# Patient Record
Sex: FEMALE | Race: WHITE | Employment: UNEMPLOYED | URBAN - METROPOLITAN AREA
[De-identification: names, ages, dates, MRNs, and addresses within clinical notes are randomized per-mention and may not be internally consistent; named-entity substitution may affect disease eponyms.]

---

## 2017-01-24 RX ORDER — HYDROCHLOROTHIAZIDE 25 MG/1
25 TABLET ORAL DAILY
Qty: 30 TAB | Refills: 2 | Status: SHIPPED | OUTPATIENT
Start: 2017-01-24 | End: 2017-03-02 | Stop reason: SDUPTHER

## 2017-02-13 ENCOUNTER — DOCUMENTATION ONLY (OUTPATIENT)
Dept: INTERNAL MEDICINE CLINIC | Age: 62
End: 2017-02-13

## 2017-02-13 ENCOUNTER — OFFICE VISIT (OUTPATIENT)
Dept: INTERNAL MEDICINE CLINIC | Age: 62
End: 2017-02-13

## 2017-02-13 VITALS
OXYGEN SATURATION: 95 % | BODY MASS INDEX: 37.49 KG/M2 | TEMPERATURE: 97.5 F | DIASTOLIC BLOOD PRESSURE: 60 MMHG | RESPIRATION RATE: 16 BRPM | SYSTOLIC BLOOD PRESSURE: 98 MMHG | HEIGHT: 64 IN | WEIGHT: 219.6 LBS | HEART RATE: 86 BPM

## 2017-02-13 DIAGNOSIS — F41.0 ANXIETY ATTACK: ICD-10-CM

## 2017-02-13 DIAGNOSIS — I10 ESSENTIAL HYPERTENSION: ICD-10-CM

## 2017-02-13 DIAGNOSIS — L98.9 SKIN LESION OF LEFT LEG: ICD-10-CM

## 2017-02-13 DIAGNOSIS — E66.9 OBESITY (BMI 30-39.9): ICD-10-CM

## 2017-02-13 DIAGNOSIS — J44.9 ASTHMA WITH COPD (HCC): Primary | ICD-10-CM

## 2017-02-13 RX ORDER — FLUTICASONE PROPIONATE AND SALMETEROL 250; 50 UG/1; UG/1
1 POWDER RESPIRATORY (INHALATION) 2 TIMES DAILY
Qty: 60 EACH | Refills: 0 | Status: SHIPPED | OUTPATIENT
Start: 2017-02-13 | End: 2017-03-15

## 2017-02-13 RX ORDER — ALBUTEROL SULFATE 90 UG/1
1 AEROSOL, METERED RESPIRATORY (INHALATION)
Qty: 1 INHALER | Refills: 0 | Status: SHIPPED | OUTPATIENT
Start: 2017-02-13 | End: 2017-08-16 | Stop reason: SDUPTHER

## 2017-02-13 RX ORDER — ALPRAZOLAM 0.5 MG/1
0.5 TABLET ORAL
Qty: 30 TAB | Refills: 0 | Status: SHIPPED | OUTPATIENT
Start: 2017-02-13 | End: 2017-11-16 | Stop reason: SDUPTHER

## 2017-02-13 NOTE — PROGRESS NOTES
Chief Complaint   Patient presents with    Medication Refill     blood pressure and an anxiety pill, xanax. states she never picked up the script.

## 2017-02-13 NOTE — MR AVS SNAPSHOT
Visit Information Date & Time Provider Department Dept. Phone Encounter #  
 2/13/2017  2:45 PM Rudolph Gardner MD [facility] Internal Medicine [phone] [id number] Upcoming Health Maintenance Date Due Hepatitis C Screening 1955 Pneumococcal 19-64 Medium Risk (1 of 1 - PPSV23) 1/12/1973 DTaP/Tdap/Td series (1 - Tdap) 1/12/1975 PAP AKA CERVICAL CYTOLOGY 1/12/1975 FOBT Q 1 YEAR AGE 50-75 1/12/2004 ZOSTER VACCINE AGE 60> 1/12/2014 INFLUENZA AGE 9 TO ADULT 8/1/2016 BREAST CANCER SCRN MAMMOGRAM 11/10/2018 Allergies as of 2/13/2017  Review Complete On: 2/13/2017 By: Ammy Hanson LPN Severity Noted Reaction Type Reactions Diphenhydramine  09/14/2016    Hives Current Immunizations  Never Reviewed No immunizations on file. Not reviewed this visit You Were Diagnosed With   
  
 Codes Comments Ast[facility] with [facility] ([facility]    -  Primary ICD-10-CM: J44.9, J45.909 ICD-9-CM: 493.20 Essential hypertension     ICD-10-CM: I10 
ICD-9-CM: 401.9 Obesity (BMI 30-39. 9)     ICD-10-CM: E66.9 ICD-9-CM: 278.00 Skin lesion of left leg     ICD-10-CM: L98.9 ICD-9-CM: 709.9 Anxiety attack     ICD-10-CM: F41.0 ICD-9-CM: 300.01 Vitals BP Pulse Temp Resp Height(growth percentile) Weight(growth percentile) 98/60 (BP 1 Location: Left arm, BP Patient Position: Sitting) 86 97.5 °F (36.4 °C) (Oral) 16 5' 4\" (1.626 m) 219 lb 9.6 oz (99.6 kg) SpO2 BMI OB Status Smoking Status 95% 37.69 kg/m2 Postmenopausal Former Smoker BMI and BSA Data Body Mass Index Body Surface Area  
 37.69 kg/m 2 2.12 m 2 Preferred Pharmacy Pharmacy Name Phone [id number] [place], [place] [phone] Your Updated Medication List  
  
   
This list is accurate as of: 2/13/17  3:49 PM.  Always use your most recent med list.  
  
  
  
  
 * albuterol 90 mcg/actuation inhaler Commonly known as:  PROVENTIL HFA, VENTOLIN HFA, PROAIR HFA Take  by inhalation. * albuterol 90 mcg/actuation inhaler Commonly known as:  PROVENTIL HFA, VENTOLIN HFA, PROAIR HFA Take 1 Puff by inhalation every six (6) hours as needed for Wheezing for up to 30 days. ALPRAZolam 0.5 mg tablet Commonly known as:  Thalia Jane Take 1 Tab by mouth nightly as needed for Anxiety for up to 30 doses. Max Daily Amount: 0.5 mg.  
  
 aspirin 81 mg chewable tablet Take 81 mg by mouth daily. fluticasone-salmeterol 250-50 mcg/dose diskus inhaler Commonly known as:  ADVAIR Take 1 Puff by inhalation two (2) times a day for 30 days. hydroCHLOROthiazide 25 mg tablet Commonly known as:  HYDRODIURIL Take 1 Tab by mouth daily. lisinopril 20 mg tablet Commonly known as:  Robertha Roup Take  by mouth daily. TYLENOL 325 mg tablet Generic drug:  acetaminophen Take  by mouth every four (4) hours as needed for Pain. * Notice: This list has 2 medication(s) that are the same as other medications prescribed for you. Read the directions carefully, and ask your doctor or other care provider to review them with you. Prescriptions Printed Refills ALPRAZolam (XANAX) 0.5 mg tablet 0 Sig: Take 1 Tab by mouth nightly as needed for Anxiety for up to 30 doses. Max Daily Amount: 0.5 mg.  
 Class: Print Route: Oral  
  
Prescriptions Sent to Pharmacy Refills  
 albuterol (PROVENTIL HFA, VENTOLIN HFA, PROAIR HFA) 90 mcg/actuation inhaler 0 Sig: Take 1 Puff by inhalation every six (6) hours as needed for Wheezing for up to 30 days. Class: Normal  
 Pharmacy: [address] [place] [address] #: [phone] Route: Inhalation  
 fluticasone-salmeterol (ADVAIR) 250-50 mcg/dose diskus inhaler 0 Sig: Take 1 Puff by inhalation two (2) times a day for 30 days. Class: Normal  
 Pharmacy: [id number] [facility] [place]  #: [phone] Route: Inhalation We Performed the Following REFERRAL TO DERMATOLOGY [REF19 Custom] Referral Information Referral ID Referred By Referred To  
  
 [id number] Nithin ANAND NP   
   [address], [address] [place] [address] Phone: [phone] Fax: [phone] Visits Status Start Date End Date 1 New Request 2/13/17 2/13/18 If your referral has a status of pending review or denied, additional information will be sent to support the outcome of this decision. Introducing [facility]! [address] introduces [employer] patient portal. Now you can access parts of your medical record, email your doctor's office, and request medication refills online. 1. In your internet browser, go to [email or web address] 2. Click on the First Time User? Click Here link in the Sign In box. You will see the New Member Sign Up page. 3. Enter your Declara Access Code exactly as it appears below. You will not need to use this code after youve completed the sign-up process. If you do not sign up before the expiration date, you must request a new code. · Declara Access Code: [email or web address] Expires: 5/14/2017  3:49 PM 
 
4. Enter the last four digits of your Social Security Number (xxxx) and Date of Birth (mm/dd/yyyy) as indicated and click Submit. You will be taken to the next sign-up page. 5. Create a GetAFivet ID. This will be your GetAFivet login ID and cannot be changed, so think of one that is secure and easy to remember. 6. Create a GetAFivet password. You can change your password at any time. 7. Enter your Password Reset Question and Answer. This can be used at a later time if you forget your password. 8. Enter your e-mail address.  You will receive e-mail notification when new information is available in [employer]. 9. Click Sign Up. You can now view and download portions of your medical record. 10. Click the Download Summary menu link to download a portable copy of your medical information. If you have questions, please visit the Frequently Asked Questions section of the [employer] website. Remember, [employer] is NOT to be used for urgent needs. For medical emergencies, dial 911. Now available from your iPhone and Android! Please provide this summary of care documentation to your next provider. Your primary care clinician is listed as Alhaij Nugent. If you have any questions after today's visit, please call [phone].

## 2017-02-13 NOTE — PROGRESS NOTES
Written by Riley Santoyo, as dictated by Dr. Lashon Leung MD.    Kevin Tee is a 60 y.o. female. HPI  The patient comes in today for a medication refill. She has not made an appointment with gastroenterologist. She needs to follow with a dermatologist. She followed with Dr. Nelia Carrillo (breast surgeon). She has been using the albuterol inhaler, for her asthma and COPD. She did not get her XR & PFT from her pulmonologist in [place]. Her breathing is not good right now. She has been to pulmonary rehab in the past, and it did help. She does sleep well. She has never been tested for sleep apnea & does not want to go for one. Her blood pressure readings have been running well in the normal range. She is trying to loose weight. She needs another prescription for xanax as she never picked up the last one from our office. Patient Active Problem List   Diagnosis Code    Pulmonary emphysema ([facility] J43.9    History of right breast cancer Z85.3    Essential hypertension with goal blood pressure less than 130/85 I10    Pre-diabetes R73.03    Mixed hyperlipidemia E78.2        Current Outpatient Prescriptions on File Prior to Visit   Medication Sig Dispense Refill    hydroCHLOROthiazide (HYDRODIURIL) 25 mg tablet Take 1 Tab by mouth daily. 30 Tab 2    lisinopril (PRINIVIL, ZESTRIL) 20 mg tablet Take  by mouth daily.  albuterol (PROVENTIL HFA, VENTOLIN HFA, PROAIR HFA) 90 mcg/actuation inhaler Take  by inhalation.  ALPRAZolam (XANAX) 0.25 mg tablet Take 1 Tab by mouth two (2) times daily as needed for Anxiety. Max Daily Amount: 0.5 mg. 60 Tab 0    acetaminophen (TYLENOL) 325 mg tablet Take  by mouth every four (4) hours as needed for Pain.  aspirin 81 mg chewable tablet Take 81 mg by mouth daily. No current facility-administered medications on file prior to visit.         Allergies   Allergen Reactions    Diphenhydramine Hives       Past Medical History Diagnosis Date    Asthma     COPD (chronic obstructive pulmonary disease) ([facility]     Hypertension        Past Surgical History   Procedure Laterality Date    Pr breast surgery procedure unlisted       ca    Hx breast lumpectomy Right      4/2011       Family History   Problem Relation Age of Onset    Hypertension Mother     Hypertension Father     Emphysema Father     Hypertension Sister        Social History     Social History    Marital status: SINGLE     Spouse name: N/A    Number of children: N/A    Years of education: N/A     Occupational History    Not on file. Social History Main Topics    Smoking status: Former Smoker    Smokeless tobacco: Not on file    Alcohol use No    Drug use: No    Sexual activity: Not on file     Other Topics Concern    Not on file     Social History Narrative       Review of Systems   Constitutional: Negative for malaise/fatigue. HENT: Negative for congestion. Respiratory: Positive for cough and shortness of breath. Negative for sputum production and wheezing. Cardiovascular: Negative for chest pain and palpitations. Musculoskeletal: Negative for joint pain and myalgias. Neurological: Negative for weakness and headaches. Visit Vitals    BP 98/60 (BP 1 Location: Left arm, BP Patient Position: Sitting)    Pulse 86    Temp 97.5 °F (36.4 °C) (Oral)    Resp 16    Ht 5' 4\" (1.626 m)    Wt 219 lb 9.6 oz (99.6 kg)    SpO2 95%    BMI 37.69 kg/m2     Physical Exam   Constitutional: She is oriented to person, place, and time. No distress. Obese   HENT:   Right Ear: External ear normal.   Left Ear: External ear normal.   Mouth/Throat: Oropharynx is clear and moist.   Eyes: Conjunctivae and EOM are normal. Right eye exhibits no discharge. Left eye exhibits no discharge. Neck: Normal range of motion. Neck supple. Cardiovascular: Normal rate and regular rhythm. Pulmonary/Chest: Effort normal. She has decreased breath sounds. She has wheezes. Abdominal: Soft. Bowel sounds are normal. She exhibits no distension. Lymphadenopathy:     She has no cervical adenopathy. Neurological: She is alert and oriented to person, place, and time. Skin: Skin is intact. Pearly white, raised skin lesion on R lower leg. Psychiatric: She has a normal mood and affect. Nursing note and vitals reviewed. ASSESSMENT and PLAN    ICD-10-CM ICD-9-CM    1. [facility] ([facility] J44.9 493.20 albuterol (PROVENTIL HFA, VENTOLIN HFA, PROAIR HFA) 90 mcg/actuation inhaler sent to pharmacy. J45.909  fluticasone-salmeterol (ADVAIR) 250-50 mcg/dose diskus inhaler sent to pharmacy. I discussed that she needs to be on a daily inhaler to help with opening up the lungs and allow more airflow. She needs to bring in the baseline pulmonary function test from her previous doctor. 2. Essential hypertension I10 401.9 Her BP is well under control. No change to dosage at this time. 3. Obesity (BMI 30-39. 9) E66.9 278.00 I discussed that she needs to cut her calorie intake. 4. Skin lesion of left leg L98.9 709.9 REFERRAL TO DERMATOLOGY    Order placed. I discussed that she can make an appointment before she leaves here. 5. Anxiety attack F41.0 300.01 ALPRAZolam (XANAX) 0.5 mg tablet script given to patient. I discussed that she needs to get her colonoscopy before she comes back. This plan was reviewed with the patient and patient agrees. All questions were answered. This scribe documentation was reviewed by me and accurately reflects the examination and decisions made by me. This note will not be viewable in [address]

## 2017-03-02 RX ORDER — HYDROCHLOROTHIAZIDE 25 MG/1
25 TABLET ORAL DAILY
Qty: 30 TAB | Refills: 2 | Status: SHIPPED | OUTPATIENT
Start: 2017-03-02 | End: 2017-06-21 | Stop reason: SDUPTHER

## 2017-04-20 ENCOUNTER — TELEPHONE (OUTPATIENT)
Dept: DERMATOLOGY | Facility: AMBULATORY SURGERY CENTER | Age: 62
End: 2017-04-20

## 2017-04-20 NOTE — TELEPHONE ENCOUNTER
Patient would like a call back regarding last office visit and about next appt coming up. Asked if you could please call [phone].     Thanks,

## 2017-04-20 NOTE — TELEPHONE ENCOUNTER
Called and spoke with patient, after confirming two patient identifiers, patient inquiring about the discomfort level of having \"spot on left inner ankle frozen\". I advised the patient that lesion that the provider deems appropriate to treat with cryotherapy do not require numbing or pain medication generally but each patient has a unique pain tolerance level. Patient stated she has an appointment for 5/4/2017 to be seen because she would like the spot gone.

## 2017-04-21 RX ORDER — LISINOPRIL 20 MG/1
20 TABLET ORAL DAILY
Qty: 90 TAB | Refills: 0 | Status: SHIPPED | OUTPATIENT
Start: 2017-04-21 | End: 2017-08-16 | Stop reason: SDUPTHER

## 2017-05-04 ENCOUNTER — HOSPITAL ENCOUNTER (OUTPATIENT)
Dept: LAB | Age: 62
Discharge: HOME | End: 2017-05-04

## 2017-05-04 ENCOUNTER — OFFICE VISIT (OUTPATIENT)
Dept: DERMATOLOGY | Facility: AMBULATORY SURGERY CENTER | Age: 62
End: 2017-05-04

## 2017-05-04 VITALS
HEART RATE: 89 BPM | WEIGHT: 219 LBS | OXYGEN SATURATION: 94 % | TEMPERATURE: 97.8 F | RESPIRATION RATE: 21 BRPM | SYSTOLIC BLOOD PRESSURE: 104 MMHG | DIASTOLIC BLOOD PRESSURE: 81 MMHG | BODY MASS INDEX: 37.39 KG/M2 | HEIGHT: 64 IN

## 2017-05-04 DIAGNOSIS — D48.5 NEOPLASM OF UNCERTAIN BEHAVIOR OF SKIN OF ANKLE: Primary | ICD-10-CM

## 2017-05-04 NOTE — PROGRESS NOTES
Name: Ying Apodaca       Age: 60 y.o. Date: 5/4/2017    Chief Complaint:   Chief Complaint   Patient presents with    Skin Exam       Subjective:    HPI:  Ms.. Ying Apodaca is a 60 y.o. female who presents for the evaluation of a lesion on the left ankle. Patient was seen on 03/06/2017 for lesion on her left inner ankle present approximately 4 months and described as an itchy wart-like lesion with crust. At that time, I made over the counter recommendations, and we decided to continue observing the lesion. She states that since that time, the lesion has continued to bother her and is tender to the touch, but she does not believe this has changed in appearance. She presents today for therapy of the lesion. ROS: Consitutional: Negative  Dermatological : positive for - skin lesion changes      Social History     Social History    Marital status: SINGLE     Spouse name: N/A    Number of children: N/A    Years of education: N/A     Occupational History    Not on file. Social History Main Topics    Smoking status: Former Smoker    Smokeless tobacco: Not on file    Alcohol use No    Drug use: No    Sexual activity: Not on file     Other Topics Concern    Not on file     Social History Narrative       Family History   Problem Relation Age of Onset    Hypertension Mother     Hypertension Father     Emphysema Father     Hypertension Sister        Past Medical History:   Diagnosis Date    Asthma     COPD [facility]chronic obstructive pulmonary disease) ([facility]     Hypertension     Sun-damaged skin     Sunburn, blistering        Past Surgical History:   Procedure Laterality Date    BREAST SURGERY PROCEDURE UNLISTED      ca    HX BREAST LUMPECTOMY Right     4/2011       Current Outpatient Prescriptions   Medication Sig Dispense Refill    lisinopril (PRINIVIL, ZESTRIL) 20 mg tablet Take 1 Tab by mouth daily for 90 days.  90 Tab 0    ALPRAZolam (XANAX) 0.5 mg tablet Take 1 Tab by mouth nightly as needed for Anxiety for up to 30 doses. Max Daily Amount: 0.5 mg. 30 Tab 0    acetaminophen (TYLENOL) 325 mg tablet Take  by mouth every four (4) hours as needed for Pain.  albuterol (PROVENTIL HFA, VENTOLIN HFA, PROAIR HFA) 90 mcg/actuation inhaler Take  by inhalation.  aspirin 81 mg chewable tablet Take 81 mg by mouth daily.  hydroCHLOROthiazide (HYDRODIURIL) 25 mg tablet Take 1 Tab by mouth daily. 30 Tab 2       Allergies   Allergen Reactions    Diphenhydramine Hives         Objective:    Visit Vitals    /81 (BP 1 Location: Left arm, BP Patient Position: Sitting)    Pulse 89    Temp 97.8 °F (36.6 °C) (Oral)    Resp 21    Ht 5' 4\" (1.626 m)    Wt 219 lb (99.3 kg)    LMP  (Exact Date)    SpO2 94%    BMI 37.59 kg/m2       Norma Fregoso is a 60 y.o. female who appears well and in no distress. She is awake, alert, and oriented. There is no preauricular, submandibular, or cervical lymphadenopathy. A limited skin examination was completed including her left ankle. There is a 9 x 8 mm keratotic pink papule on her right medial ankle, this is clinically different from her last exam now concerning for squamous cell carcinoma. Assessment/Plan:    1. Neoplasm of Uncertain Behavior, right medial ankle - inflamed SK vs. SCC. The differential diagnoses were discussed. We discussed options for therapy including cryotherapy vs. shave removal. I reviewed benefits and risks as well as expected healing of both procedures: healing of wound from freezing is equivalent to healing from shave removal. The lesion is clinically different from her prior examination. I strongly recommended shave removal with tissue sent off to pathology for definitive diagnosis. She understands her options and elects to proceed with a shave removal.   The procedure was reviewed and verbal and written consent were obtained. The risks of pain, bleeding, infection, recurrence and scar were discussed. I performed the procedure. The site was cleansed and anesthetized with 1% Lidocaine with Epinephrine 1:100,000. A shave removal was performed to remove the lesion in its clinical entirety. Drysol was used for hemostasis. The wound was bandaged and care reviewed. The specimen was sent to pathology. I will contact the patient with the results and any further treatment that may be necessary. [facility]   OFFICE PROCEDURE PROGRESS NOTE   Chart reviewed for the following:   ICelestine, [address], have reviewed the History, Physical and updated the Allergic reactions for [place]. TIME OUT performed immediately prior to start of procedure:   IMiley, have performed the following reviews on Shellie Victor   prior to the start of the procedure:     * Patient was identified by name and date of birth   * Agreement on procedure being performed was verified   * Risks and Benefits explained to the patient   * Procedure site verified and marked as necessary   * Patient was positioned for comfort   * Consent was signed and verified     Time: 11:30 AM  Date of procedure: 5/4/2017  Procedure performed by: Evangelist Ellington.  Kristie Stauffer  Provider assisted by: Pancho Xiong LPN   Patient assisted by: self   How tolerated by patient: tolerated the procedure well with no complications   Comments: none

## 2017-05-04 NOTE — MR AVS SNAPSHOT
Visit Information Date & Time Provider Department Dept. Phone Encounter #  
 5/4/2017 11:00 AM [facility] [id number] [phone] [id number] Upcoming Health Maintenance Date Due Hepatitis C Screening 1955 Pneumococcal 19-64 Medium Risk (1 of 1 - PPSV23) 1/12/1973 DTaP/Tdap/Td series (1 - Tdap) 1/12/1975 PAP AKA CERVICAL CYTOLOGY 1/12/1975 FOBT Q 1 YEAR AGE 50-75 1/12/2004 ZOSTER VACCINE AGE 60> 1/12/2014 INFLUENZA AGE 9 TO ADULT 8/1/2017 BREAST CANCER SCRN MAMMOGRAM 11/10/2018 Allergies as of 5/4/2017  Review Complete On: 5/4/2017 By: Angelica Adamson LPN Severity Noted Reaction Type Reactions Diphenhydramine  09/14/2016    Hives Current Immunizations  Never Reviewed No immunizations on file. Not reviewed this visit Vitals BP Pulse Temp Resp Height(growth percentile) Weight(growth percentile) 104/81 (BP 1 Location: Left arm, BP Patient Position: Sitting) 89 97.8 °F (36.6 °C) (Oral) 21 5' 4\" (1.626 m) 219 lb (99.3 kg) LMP SpO2 BMI OB Status Smoking Status (Exact Date) 94% 37.59 kg/m2 Postmenopausal Former Smoker Vitals History BMI and BSA Data Body Mass Index Body Surface Area  
 37.59 kg/m 2 2.12 m 2 Preferred Pharmacy Pharmacy Name Phone [id number] [place], [place] [phone] Your Updated Medication List  
  
   
This list is accurate as of: 5/4/17 11:10 AM.  Always use your most recent med list.  
  
  
  
  
 albuterol 90 mcg/actuation inhaler Commonly known as:  PROVENTIL HFA, VENTOLIN HFA, PROAIR HFA Take  by inhalation. ALPRAZolam 0.5 mg tablet Commonly known as:  Marilyn Wood Take 1 Tab by mouth nightly as needed for Anxiety for up to 30 doses. Max Daily Amount: 0.5 mg.  
  
 aspirin 81 mg chewable tablet Take 81 mg by mouth daily. hydroCHLOROthiazide 25 mg tablet Commonly known as:  HYDRODIURIL Take 1 Tab by mouth daily. lisinopril 20 mg tablet Commonly known as:  Idania  Take 1 Tab by mouth daily for 90 days. TYLENOL 325 mg tablet Generic drug:  acetaminophen Take  by mouth every four (4) hours as needed for Pain. Patient Instructions Self Skin Exam and Sunscreens Early detection and treatment is essential in the treatment of all forms of skin cancer. If caught early, all forms of skin cancer are curable. In addition to your regular visits, you should perform a monthly skin examination. Over time, you become familiar with what is normally found on your skin and can identify new or suspicious spots. One of the screening tools you can use to assess your skin is to follow the ABCDEs: 
 
A= Asymmetry (One half is unlike the other half) B= Border (An irregular, scalloped or poorly defined edge) C= Color (Is varied from one area to another, has shades of tan, brown/ black,       white, red or blue) D= Diameter (Spots larger than 6mm or a pencil eraser) E= Evolving (New spots or one that is changing in size, shape, or color) A follow- up interval will be customized based on your history of skin cancer or level of skin damage and risk factors. In any case, if you notice a suspicious or new spot, an appointment should be arranged between regular visits. Everyone should use sunscreen and sun-safe practices, which is especially important for those with a personal or family history of skin cancer. Suggestions for this include: 1. Use daily moisturizers containing SPF 30 or higher. 2. Wear long sleeve clothing with UPF ratings and a broad-brimmed hat. 3. Apply sunscreen with SPF 30 or higher to all sun exposed areas if you are going to be in the sun. A broad spectrum UVA/ UVB sunscreen is best.  Dont forget to REAPPLY every two hours or more often if swimming or sweating! 4. Avoid outside activities during peak sun hours, especially in the summer (10am- 2pm). 5. DO NOT use tanning beds. Using sunscreen and sun-safe practices can help reduce the likelihood of developing skin cancer or additional skin cancers in those previously diagnosed. Introducing [facility]! Chente Iniguez introduces Glimpse patient portal. Now you can access parts of your medical record, email your doctor's office, and request medication refills online. 1. In your internet browser, go to [email or web address] 2. Click on the First Time User? Click Here link in the Sign In box. You will see the New Member Sign Up page. 3. Enter your Glimpse Access Code exactly as it appears below. You will not need to use this code after youve completed the sign-up process. If you do not sign up before the expiration date, you must request a new code. · Glimpse Access Code: [email or web address] Expires: 5/14/2017  4:49 PM 
 
4. Enter the last four digits of your Social Security Number (xxxx) and Date of Birth (mm/dd/yyyy) as indicated and click Submit. You will be taken to the next sign-up page. 5. Create a Glimpse ID. This will be your Glimpse login ID and cannot be changed, so think of one that is secure and easy to remember. 6. Create a Glimpse password. You can change your password at any time. 7. Enter your Password Reset Question and Answer. This can be used at a later time if you forget your password. 8. Enter your e-mail address. You will receive e-mail notification when new information is available in [address] 9. Click Sign Up. You can now view and download portions of your medical record. 10. Click the Download Summary menu link to download a portable copy of your medical information. If you have questions, please visit the Frequently Asked Questions section of the Glimpse website. Remember, Glimpse is NOT to be used for urgent needs. For medical emergencies, dial 911. Now available from your iPhone and Android! Please provide this summary of care documentation to your next provider. Your primary care clinician is listed as oCrrine Mullen. If you have any questions after today's visit, please call [phone].

## 2017-06-21 RX ORDER — HYDROCHLOROTHIAZIDE 25 MG/1
TABLET ORAL
Qty: 30 TAB | Refills: 0 | Status: SHIPPED | OUTPATIENT
Start: 2017-06-21 | End: 2017-08-07 | Stop reason: SDUPTHER

## 2017-08-07 RX ORDER — HYDROCHLOROTHIAZIDE 25 MG/1
TABLET ORAL
Qty: 30 TAB | Refills: 0 | Status: SHIPPED | OUTPATIENT
Start: 2017-08-07 | End: 2017-09-12 | Stop reason: SDUPTHER

## 2017-08-15 ENCOUNTER — TELEPHONE (OUTPATIENT)
Dept: INTERNAL MEDICINE CLINIC | Age: 62
End: 2017-08-15

## 2017-08-15 ENCOUNTER — TELEPHONE (OUTPATIENT)
Dept: SURGERY | Age: 62
End: 2017-08-15

## 2017-08-15 DIAGNOSIS — R92.8 ABNORMAL MAMMOGRAM: ICD-10-CM

## 2017-08-15 DIAGNOSIS — Z85.3 HISTORY OF RIGHT BREAST CANCER: Primary | ICD-10-CM

## 2017-08-15 NOTE — TELEPHONE ENCOUNTER
Received voicemail from [facility] scheduling (#[phone]) regarding patient needing mammogram and EKG orders. They were told they would be faxed, but they have not received them yet. I reviewed patient's chart and PCP office has been communicating with patient about this, not our office. I called [facility] scheduling back and spoke with representative. I gave them Dr. Calvo Homes name and office number. I gave her our phone number so we can help them if there is a problem.

## 2017-08-16 DIAGNOSIS — J44.9 ASTHMA WITH COPD (HCC): ICD-10-CM

## 2017-08-17 RX ORDER — LISINOPRIL 20 MG/1
TABLET ORAL
Qty: 90 TAB | Refills: 0 | Status: SHIPPED | OUTPATIENT
Start: 2017-08-17 | End: 2017-09-12 | Stop reason: SDUPTHER

## 2017-08-17 RX ORDER — ALBUTEROL SULFATE 90 UG/1
AEROSOL, METERED RESPIRATORY (INHALATION)
Qty: 8.5 INHALER | Refills: 0 | Status: SHIPPED | OUTPATIENT
Start: 2017-08-17

## 2017-09-12 RX ORDER — HYDROCHLOROTHIAZIDE 25 MG/1
TABLET ORAL
Qty: 30 TAB | Refills: 0 | Status: SHIPPED | OUTPATIENT
Start: 2017-09-12 | End: 2017-09-29 | Stop reason: SDUPTHER

## 2017-09-19 ENCOUNTER — OFFICE VISIT (OUTPATIENT)
Dept: INTERNAL MEDICINE CLINIC | Age: 62
End: 2017-09-19

## 2017-09-19 VITALS
BODY MASS INDEX: 40.26 KG/M2 | OXYGEN SATURATION: 92 % | SYSTOLIC BLOOD PRESSURE: 106 MMHG | DIASTOLIC BLOOD PRESSURE: 70 MMHG | WEIGHT: 235.8 LBS | HEIGHT: 64 IN | HEART RATE: 88 BPM | TEMPERATURE: 98.2 F | RESPIRATION RATE: 14 BRPM

## 2017-09-19 DIAGNOSIS — J43.9 PULMONARY EMPHYSEMA, UNSPECIFIED EMPHYSEMA TYPE (HCC): ICD-10-CM

## 2017-09-19 DIAGNOSIS — Z71.89 ACP (ADVANCE CARE PLANNING): ICD-10-CM

## 2017-09-19 DIAGNOSIS — Z00.00 MEDICARE ANNUAL WELLNESS VISIT, SUBSEQUENT: Primary | ICD-10-CM

## 2017-09-19 DIAGNOSIS — E78.2 MIXED HYPERLIPIDEMIA: ICD-10-CM

## 2017-09-19 DIAGNOSIS — R06.09 EXERTIONAL DYSPNEA: ICD-10-CM

## 2017-09-19 DIAGNOSIS — I10 ESSENTIAL HYPERTENSION: ICD-10-CM

## 2017-09-19 RX ORDER — BUDESONIDE 0.5 MG/2ML
INHALANT ORAL
Refills: 3 | COMMUNITY
Start: 2017-08-07

## 2017-09-19 RX ORDER — ALBUTEROL SULFATE 0.83 MG/ML
SOLUTION RESPIRATORY (INHALATION)
Refills: 3 | COMMUNITY
Start: 2017-07-05

## 2017-09-19 NOTE — PATIENT INSTRUCTIONS
Medicare Part B Preventive Services Guidelines/Limitations Date last completed and Frequency Due Date   Bone Mass Measurement  (age 72 & older, biennial) Requires diagnosis related to osteoporosis or estrogen deficiency. Biennial benefit unless patient has history of long-term glucocorticoid tx or baseline is needed because initial test was by other method, or for patients with vertebral abnormalities on x-ray Completed     Recommended every 2 years As recommended by your PCP or Specialist     Cardiovascular Screening Blood Tests (every 5 years)  Total cholesterol, HDL, Triglycerides Order as a panel if possible Completed     As recommended by your PCP As recommended by your PCP or Specialist   Hepatitis B vaccines  (covered for patients at high risk- hemophilia, ESRD, DM, body fluid contact Three shot series covered once         Zoster Shingles vaccine Covered by Medicare Part D through the pharmacy- PCP provides prescription Completed     Recommended once over age 48  Due   Pneumococcal vaccine (once after age 72)  Completed   Recommended once over the age of 72 Due   Colorectal Cancer Screening  -Fecal occult blood test (annual)  -Flexible sigmoidoscopy (5y)  -Screening colonoscopy (10y)  -Barium Enema Age 49-80;  After age [de-identified] if history of abnormal results Completed      Recommended every 5 to 10 years  As recommended by your PCP or Specialist     Counseling to Prevent Tobacco Use (up to 8 sessions per year)  - Counseling greater than 3 and up to 10 minutes  - Counseling greater than 10 minutes Patients must be asymptomatic of tobacco-related conditions to receive as preventive service N/A N/A   Diabetes Screening Tests (at least every 3 years, Medicare covers annually or at 6-month intervals for prediabetic patients)    Fasting blood sugar (FBS) or glucose tolerance test (GTT) Patient must be diagnosed with one of the following:  -Hypertension, Dyslipidemia, obesity, previous impaired FBS or GTT  Or any two of the following: overweight, FH of diabetes, age ? 72, history of gestational diabetes, birth of baby weighing more than 9 pounds Completed     Recommended every 3 years for non-diabetics    Recommended every 3-6 months for Pre-Diabetics and Diabetics As recommended by your PCP or Specialist     Lung Cancer Screening-with low dose CT for patients who meet all criteria:  -Age 50-69  -either a current smoker or have quit in the past 15 yrs  -have a smoking history of 30 pack years or more  -have a written order from MD for screening Covered once every 12 months      Glaucoma Screening (no USPSTF recommendation) Covered for high risk patients such as patients with Diabetes mellitus, family history of glaucoma, , age 48 or over,  American, age 72 or over Completed     Recommended annually Due    Seasonal Influenza Vaccination (annually)  Completed   Recommended Annually Due    TDAP Vaccination Covered by Medicare part D through the pharmacy- PCP provides prescription Completed     Recommended every 10 years Due    Prevnar 15 vaccine  Prevnar 15 - Recommended once over the age of 72    Screening Mammography (biennial age 54-69) Annually (age 36 or over) Completed    As recommended by your PCP or Specialist     Screening Pap Tests and Pelvic Examination (up to age 79 and after 79 if unknown history or abnormal study last 8 years) Every 25 months except high risk As recommended by your PCP or Specialist   As recommended by your PCP or Specialist     HIV Screening (includes patients at high risk and includes any patient that requests the test and pregnant women Covered once every 12 months or up to 3 times during pregnancy                 Hepatitis C screening tests             Indicated for patients with at least one of the following: current or past history of IV drug use, those who had blood transfusion before 1992, those born between [facility].        Please contact Johanne JEFFRIES/Nurse Navigator with any questions about your visit or instructions today. Thank you for the opportunity for us to participate in your care.

## 2017-09-19 NOTE — ACP (ADVANCE CARE PLANNING)
Advance Care Planning (ACP) Provider Conversation Snapshot    Date of ACP Conversation: 09/19/17  Persons included in Conversation:  patient  Length of ACP Conversation in minutes:  16 minutes            For Patients with Decision Making Capacity:   Values/Goals: Exploration of values, goals, and preferences if recovery is not expected, even with continued medical treatment in the event of:  Imminent death    Conversation Outcomes / Follow-Up Plan:   Recommended completion of Advance Directive form after review of ACP materials and conversation with prospective healthcare agent

## 2017-09-19 NOTE — PROGRESS NOTES
NN Medicare Wellness Visit      Lc Cedeno is a 60 y.o. female and presents for Annual Medicare Wellness Visit. Assessment of cognitive impairment: Alert and oriented x 3. Depression Screen:   PHQ over the last two weeks 9/19/2017   Little interest or pleasure in doing things Not at all   Feeling down, depressed or hopeless Not at all   Total Score PHQ 2 0       Fall Risk Assessment:  No flowsheet data found. Abuse Screen:   Abuse Screening Questionnaire 9/19/2017   Do you ever feel afraid of your partner? N   Are you in a relationship with someone who physically or mentally threatens you? N   Is it safe for you to go home? Y       Activities of Daily Living:  Self-care. Requires assistance with: no ADLs  Patient handle his/her own medications  yes Use of pill box  yes  Activities of Daily Living:   ADL Assessment 9/19/2017   Feeding yourself No Help Needed   Getting from bed to chair No Help Needed   Getting dressed No Help Needed   Bathing or showering No Help Needed   Walk across the room (includes cane/walker) No Help Needed   Using the telphone No Help Needed   Taking your medications No Help Needed   Preparing meals No Help Needed   Managing money (expenses/bills) No Help Needed   Moderately strenuous housework (laundry) No Help Needed   Shopping for personal items (toiletries/medicines) No Help Needed   Shopping for groceries No Help Needed   Driving No Help Needed   Climbing a flight of stairs No Help Needed   Getting to places beyond walking distances No Help Needed       Health Maintenance:  Daily Aspirin: no and recommended to start daily 81mg states she takes it once in a while  Bone Density: not up to date - states she does not want done  Glaucoma Screening: yes done one year ago in [place]  Immunizations:    Tetanus: patient declines. Influenza: patient declines. Shingles: declines  PPSV-23: declines. Prevnar-13: declines. Cancer screening:    Cervical: up to date.  States it was done in [place]  Breast: up to date. At [facility] one month ago  Colon: not up to date - states it is on her to do list.     Alcohol Risk Screen:   On any occasion during the past 3 months, have you had more than 3 drinks(female) or 4 drinks (male) containing alcohol in one? No  Do you average more than 7 drinks (female) or 14 drinks (male) per week? No  Type and amount:na    Hearing Loss:  Hearing is good. denies any hearing loss wears hearing aides    Vision Loss:   Wears glasses,     Adult Nutrition Screen:  No risk factors noted. Advance Care Planning:   End of Life Planning: has NO advanced directive  - add't info provided,   Adriane Broderick [address] ACP-Facilitator appointment no      Medications/Allergies: Reviewed with patient  Prior to Admission medications    Medication Sig Start Date End Date Taking? Authorizing Provider   hydroCHLOROthiazide (HYDRODIURIL) 25 mg tablet TAKE 1 TABLET BY MOUTH EVERY DAY 9/12/17  Yes Kristy Jasso NP   lisinopril (PRINIVIL, ZESTRIL) 20 mg tablet TAKE 1 TAB BY MOUTH DAILY FOR 90 DAYS. 9/12/17  Yes Kristy Jasso NP   PROAIR HFA 90 mcg/actuation inhaler INHALE 1 PUFF BY MOUTH EVERY SIX (6) HOURS AS NEEDED FOR WHEEZING FOR UP TO 30 DAYS. 8/17/17  Yes Alexi Medina NP   ALPRAZolam Cammie Burgos) 0.5 mg tablet Take 1 Tab by mouth nightly as needed for Anxiety for up to 30 doses. Max Daily Amount: 0.5 mg. 2/13/17  Yes Franky Grajeda MD   albuterol (PROVENTIL VENTOLIN) 2.5 mg /3 mL (0.083 %) nebulizer solution INHALE 1 VIAL VIA NEBULIZER EVERY 4 HOURS AS NEEED 7/5/17   Historical Provider   budesonide (PULMICORT) 0.5 mg/2 mL nbsp USE 1 VIAL IN NEBULIZER TWICE A DAY 8/7/17   Historical Provider   acetaminophen (TYLENOL) 325 mg tablet Take  by mouth every four (4) hours as needed for Pain. Historical Provider   albuterol (PROVENTIL HFA, VENTOLIN HFA, PROAIR HFA) 90 mcg/actuation inhaler Take  by inhalation.     Historical Provider   aspirin 81 mg chewable tablet Take 81 mg by mouth daily. Historical Provider       Allergies   Allergen Reactions    Diphenhydramine Hives       Objective:  Visit Vitals    /70 (BP 1 Location: Right arm, BP Patient Position: Sitting)    Pulse 88    Temp 98.2 °F (36.8 °C) (Oral)    Resp 14    Ht 5' 4\" (1.626 m)    Wt 235 lb 12.8 oz (107 kg)    LMP  (Exact Date)    SpO2 92%  Comment: states that she should be on oxygen    BMI 40.47 kg/m2    Body mass index is 40.47 kg/(m^2). Problem List: Reviewed with patient and discussed risk factors. Patient Active Problem List   Diagnosis Code    Pulmonary emphysema ([facility] [address] J43.9    History of right breast cancer Z85.3    Essential hypertension with goal blood pressure less than 130/85 I10    Pre-diabetes R73.03    Mixed hyperlipidemia E78.2       PSH: Reviewed with patient  Past Surgical History:   Procedure Laterality Date    BREAST SURGERY PROCEDURE UNLISTED      ca    HX BREAST LUMPECTOMY Right     4/2011        SH: Reviewed with patient  Social History   Substance Use Topics    Smoking status: Former Smoker    Smokeless tobacco: Never Used    Alcohol use No       FH: Reviewed with patient  Family History   Problem Relation Age of Onset    Hypertension Mother     Hypertension Father     Emphysema Father     Hypertension Sister        Current medical providers:    Patient Care Team:  Hari Garcia MD as PCP - General (Internal Medicine)  Claudio Brittle, MD (Breast Surgery)    Plan:    Diagnoses and all orders for this visit:    1. Medicare annual wellness visit, subsequent  -     HEPATITIS C AB; Future    2. ACP (advance care planning)     Advanced directive discussed with her. She will make an appointment with [facility]. 3. Essential hypertension  -     REFERRAL TO CARDIOLOGY  -     METABOLIC PANEL, COMPREHENSIVE; Future  -     TSH 3RD GENERATION; Future    4. Mixed hyperlipidemia  -     REFERRAL TO CARDIOLOGY  -     CBC W/O DIFF;  Future  -     LIPID PANEL; Future    5. Pulmonary emphysema, unspecified emphysema type ([address]   Urged to get flu & pneumonia shots but she is adamant not getting any shots. Well aware of consequences. 6. Exertional dyspnea  -     REFERRAL TO CARDIOLOGY        Orders Placed This Encounter    albuterol (PROVENTIL VENTOLIN) 2.5 mg /3 mL (0.083 %) nebulizer solution    budesonide (PULMICORT) 0.5 mg/2 mL nbsp       Health Maintenance   Topic Date Due    Hepatitis C Screening  1955    Pneumococcal 19-64 Medium Risk (1 of 1 - PPSV23) 01/12/1973    DTaP/Tdap/Td series (1 - Tdap) 01/12/1975    PAP AKA CERVICAL CYTOLOGY  01/12/1975    FOBT Q 1 YEAR AGE 50-75  01/12/2004    ZOSTER VACCINE AGE 60>  11/12/2013    INFLUENZA AGE 9 TO ADULT  08/01/2017    BREAST CANCER SCRN MAMMOGRAM  11/10/2018          Urinary/ Fecal Incontinence: no    Regular physical exercise: yes swimming     Patient verbalized understanding of information presented. AVS and Medicare Part B Preventive Services Table printed and given to pt and reviewed. See table for findings under Recommendation and Scheduled. All of the patient's questions were answered.

## 2017-09-19 NOTE — PROGRESS NOTES
Chief Complaint   Patient presents with   [facility] Annual Wellness Visit     pt has no complaints at this time      Visit Vitals    /70 (BP 1 Location: Right arm, BP Patient Position: Sitting)    Pulse 88    Temp 98.2 °F (36.8 °C) (Oral)    Resp 14    Ht 5' 4\" (1.626 m)    Wt 235 lb 12.8 oz (107 kg)    SpO2 92%    BMI 40.47 kg/m2      1. Have you been to the ER, urgent care clinic since your last visit? Hospitalized since your last visit? No    2. Have you seen or consulted any other health care providers outside of the [address] [place] [address] since your last visit? Include any pap smears or colon screening. Yes.  [facility], [facility] for a breast exam

## 2017-10-04 RX ORDER — HYDROCHLOROTHIAZIDE 25 MG/1
TABLET ORAL
Qty: 90 TAB | Refills: 0 | Status: SHIPPED | OUTPATIENT
Start: 2017-10-04 | End: 2018-01-06 | Stop reason: SDUPTHER

## 2017-10-06 DIAGNOSIS — Z85.3 HISTORY OF RIGHT BREAST CANCER: ICD-10-CM

## 2017-10-06 DIAGNOSIS — R92.8 ABNORMAL MAMMOGRAM: ICD-10-CM

## 2017-11-16 ENCOUNTER — DOCUMENTATION ONLY (OUTPATIENT)
Dept: INTERNAL MEDICINE CLINIC | Age: 62
End: 2017-11-16

## 2017-11-16 DIAGNOSIS — F41.0 ANXIETY ATTACK: ICD-10-CM

## 2017-11-16 RX ORDER — ALPRAZOLAM 0.5 MG/1
TABLET ORAL
Qty: 30 TAB | Refills: 0 | Status: SHIPPED | OUTPATIENT
Start: 2017-11-16 | End: 2017-12-27 | Stop reason: SDUPTHER

## 2017-11-16 NOTE — PROGRESS NOTES
Called number on file [phone] and received a message that number not in service. Pt has emergency number but did not call that cause this is not an emergency. Need to let her know that her Rx is ready for . If she calls looking for her Rx please have her update her phone numbers in the system.

## 2018-01-06 RX ORDER — HYDROCHLOROTHIAZIDE 25 MG/1
TABLET ORAL
Qty: 90 TAB | Refills: 0 | Status: SHIPPED | OUTPATIENT
Start: 2018-01-06 | End: 2018-04-02 | Stop reason: SDUPTHER

## 2018-03-08 RX ORDER — LISINOPRIL 20 MG/1
TABLET ORAL
Qty: 30 TAB | Refills: 2 | Status: SHIPPED | OUTPATIENT
Start: 2018-03-08 | End: 2018-07-01 | Stop reason: SDUPTHER

## 2018-03-21 ENCOUNTER — TELEPHONE (OUTPATIENT)
Dept: INTERNAL MEDICINE CLINIC | Age: 63
End: 2018-03-21

## 2018-04-02 RX ORDER — HYDROCHLOROTHIAZIDE 25 MG/1
TABLET ORAL
Qty: 90 TAB | Refills: 0 | Status: SHIPPED | OUTPATIENT
Start: 2018-04-02 | End: 2018-07-02 | Stop reason: SDUPTHER

## 2018-07-02 RX ORDER — HYDROCHLOROTHIAZIDE 25 MG/1
TABLET ORAL
Qty: 90 TAB | Refills: 0 | Status: SHIPPED | OUTPATIENT
Start: 2018-07-02 | End: 2018-09-27 | Stop reason: SDUPTHER

## 2018-07-02 RX ORDER — LISINOPRIL 20 MG/1
TABLET ORAL
Qty: 90 TAB | Refills: 0 | Status: SHIPPED | OUTPATIENT
Start: 2018-07-02 | End: 2018-09-27 | Stop reason: SDUPTHER

## 2018-09-27 RX ORDER — HYDROCHLOROTHIAZIDE 25 MG/1
TABLET ORAL
Qty: 90 TAB | Refills: 0 | Status: SHIPPED | OUTPATIENT
Start: 2018-09-27

## 2018-09-27 RX ORDER — LISINOPRIL 20 MG/1
TABLET ORAL
Qty: 90 TAB | Refills: 0 | Status: SHIPPED | OUTPATIENT
Start: 2018-09-27